# Patient Record
(demographics unavailable — no encounter records)

---

## 2025-04-09 NOTE — HISTORY OF PRESENT ILLNESS
[de-identified] : seen for CPE  referred by mother and sister   As per pt - her mother told she had a heart procedure in 2001 and she is not sure what it was - - no s/s - wants to get referral to see cardio

## 2025-04-09 NOTE — HEALTH RISK ASSESSMENT
[Good] : ~his/her~  mood as  good [Yes] : Yes [2 - 4 times a month (2 pts)] : 2-4 times a month (2 points) [1 or 2 (0 pts)] : 1 or 2 (0 points) [Never (0 pts)] : Never (0 points) [No] : In the past 12 months have you used drugs other than those required for medical reasons? No [No falls in past year] : Patient reported no falls in the past year [0] : 2) Feeling down, depressed, or hopeless: Not at all (0) [PHQ-2 Negative - No further assessment needed] : PHQ-2 Negative - No further assessment needed [Never] : Never [Patient reported PAP Smear was normal] : Patient reported PAP Smear was normal [Alone] : lives alone [Employed] : employed [Single] : single [de-identified] : 2 x a wek walking jogging weights  [[id number]] : 0 [Reports changes in hearing] : Reports no changes in hearing [Reports changes in vision] : Reports no changes in vision [Reports changes in dental health] : Reports no changes in dental health [PapSmearDate] : 4/2024  [FreeTextEntry2] :  - [facility]

## 2025-04-09 NOTE — ASSESSMENT
[Vaccines Reviewed] : Immunizations reviewed today. Please see immunization details in the vaccine log within the immunization flowsheet.  [FreeTextEntry1] : Thyroid US 4/17/24 -RT thyroid nodule - dominant -saw Endo  5/13/24 did FNA 6/13/24 neg -adv f/u US 5/2025   Seasonal allergies- advised otc Zyrtec - rx for Flonase as needed   Fhx cancer - mother dx breast ca at age 42 - mother did brca testing negative  - Maternal GM - hx ovarian cancer  - pt saw her GYN DR Mary [facility] [place] 4/2024  - pap neg mammo / us -- 6/2024 neg   Atypical mole left ACW - getting bigger -saw derm 2024 - benign   hx Left Foot cosmetic toe lengthening using spacer implant 3rd digit  Date of Procedure: 3/13/24    Health maintenance  Mammo-   6/2024  PAP-4/2024 neg as per pt  STD offered-3/2024 neg  Dermatology consult advised -for skin ca screening  Flu vaccine -2022  Tdap-3/23/23  Covid vaccine -2 doses 2021

## 2025-05-22 NOTE — PHYSICAL EXAM
[Well Developed] : well developed [Well Nourished] : well nourished [No Acute Distress] : no acute distress [Normal Conjunctiva] : normal conjunctiva [Normal Venous Pressure] : normal venous pressure [No Carotid Bruit] : no carotid bruit [Normal S1, S2] : normal S1, S2 [No Rub] : no rub [No Gallop] : no gallop [Clear Lung Fields] : clear lung fields [Good Air Entry] : good air entry [No Respiratory Distress] : no respiratory distress  [Soft] : abdomen soft [Non Tender] : non-tender [No Masses/organomegaly] : no masses/organomegaly [Normal Bowel Sounds] : normal bowel sounds [Normal Gait] : normal gait [No Edema] : no edema [No Cyanosis] : no cyanosis [No Clubbing] : no clubbing [No Varicosities] : no varicosities [No Rash] : no rash [No Skin Lesions] : no skin lesions [Moves all extremities] : moves all extremities [No Focal Deficits] : no focal deficits [Normal Speech] : normal speech [Alert and Oriented] : alert and oriented [Normal memory] : normal memory [de-[facility]] [facility] [de[facility]identified] : Very small (5 mm) scar under left breast

## 2025-05-22 NOTE — HISTORY OF PRESENT ILLNESS
[FreeTextEntry1] :   Ms. RICKY TORRES is a pleasant 33-year-old woman with history of unspecified congenital heart defect, s/p corrective procedure in 2001, presents to establish care.  She feels well.  Patient denies any chest pain, shortness of breath, palpitations, dizziness or syncope.  Denies lower extremity edema, orthopnea or paroxysmal nocturnal dyspnea.  Admits to snoring, denies daytime somnolence or morning fatigue.   Patient reports good exercise capacity - unlimited on flat ground, mildly short of breath after climbing 2 flights of stairs.  She does not know the exact CHD lesion she had.  Does not recall any exertional dyspnea or fainting spells in childhood.  Believes she had a "coiling procedure" done at [place] at age 10 in 2001.  She was unable to locate her records.

## 2025-05-22 NOTE — DISCUSSION/SUMMARY
[___ Week(s)] : in [unfilled] week(s) [With ___] : with [unfilled] [EKG obtained to assist in diagnosis and management of assessed problem(s)] : EKG obtained to assist in diagnosis and management of assessed problem(s)

## 2025-06-20 NOTE — HISTORY OF PRESENT ILLNESS
[FreeTextEntry1] :   Ms. RICKY TORRES is a pleasant 34-year-old woman with history of unspecified congenital heart defect, s/p corrective procedure in 2001, presents for follow-up. She continues to feel well.  Patient denies any chest pain, shortness of breath, palpitations, dizziness or syncope.  Denies lower extremity edema, orthopnea or paroxysmal nocturnal dyspnea.  Admits to snoring, denies daytime somnolence or morning fatigue.   Patient reports good exercise capacity - unlimited on flat ground, mildly short of breath after climbing 2 flights of stairs.  She does not know the exact CHD lesion she had.  Does not recall any exertional dyspnea or fainting spells in childhood.  Believes she had a "coiling procedure" done at [place] at age 10 in 2001.  She was unable to locate her records.

## 2025-06-20 NOTE — PHYSICAL EXAM
[Well Developed] : well developed [Well Nourished] : well nourished [No Acute Distress] : no acute distress [Normal Conjunctiva] : normal conjunctiva [Normal Venous Pressure] : normal venous pressure [No Carotid Bruit] : no carotid bruit [Normal S1, S2] : normal S1, S2 [No Rub] : no rub [No Gallop] : no gallop [Clear Lung Fields] : clear lung fields [Good Air Entry] : good air entry [No Respiratory Distress] : no respiratory distress  [Soft] : abdomen soft [Non Tender] : non-tender [No Masses/organomegaly] : no masses/organomegaly [Normal Bowel Sounds] : normal bowel sounds [Normal Gait] : normal gait [No Edema] : no edema [No Cyanosis] : no cyanosis [No Clubbing] : no clubbing [No Varicosities] : no varicosities [No Rash] : no rash [No Skin Lesions] : no skin lesions [Moves all extremities] : moves all extremities [No Focal Deficits] : no focal deficits [Normal Speech] : normal speech [Alert and Oriented] : alert and oriented [Normal memory] : normal memory [de-identified] [facility] : Very small (5 mm) scar under left breast

## 2025-06-20 NOTE — CARDIOLOGY SUMMARY
[de-identified] : 5/22/2025 NL ECG [de-identified] : 6/13/2025 Normal, echogenic focus at aortic arch s/o possible prior PDA closure